# Patient Record
Sex: FEMALE | Race: AMERICAN INDIAN OR ALASKA NATIVE
[De-identification: names, ages, dates, MRNs, and addresses within clinical notes are randomized per-mention and may not be internally consistent; named-entity substitution may affect disease eponyms.]

---

## 2018-10-03 ENCOUNTER — HOSPITAL ENCOUNTER (EMERGENCY)
Dept: HOSPITAL 31 - C.ER | Age: 38
Discharge: HOME | End: 2018-10-03
Payer: COMMERCIAL

## 2018-10-03 VITALS — DIASTOLIC BLOOD PRESSURE: 69 MMHG | SYSTOLIC BLOOD PRESSURE: 108 MMHG | RESPIRATION RATE: 18 BRPM | HEART RATE: 65 BPM

## 2018-10-03 VITALS — TEMPERATURE: 98.5 F

## 2018-10-03 VITALS — OXYGEN SATURATION: 94 %

## 2018-10-03 VITALS — BODY MASS INDEX: 31.4 KG/M2

## 2018-10-03 DIAGNOSIS — R10.31: Primary | ICD-10-CM

## 2018-10-03 LAB
ALBUMIN SERPL-MCNC: 3.6 G/DL (ref 3.5–5)
ALBUMIN/GLOB SERPL: 1.1 {RATIO} (ref 1–2.1)
ALT SERPL-CCNC: 46 U/L (ref 9–52)
AST SERPL-CCNC: 24 U/L (ref 14–36)
BASOPHILS # BLD AUTO: 0.1 K/UL (ref 0–0.2)
BASOPHILS NFR BLD: 1.4 % (ref 0–2)
BILIRUB UR-MCNC: NEGATIVE MG/DL
BUN SERPL-MCNC: 11 MG/DL (ref 7–17)
CALCIUM SERPL-MCNC: 9.3 MG/DL (ref 8.6–10.4)
EOSINOPHIL # BLD AUTO: 0.1 K/UL (ref 0–0.7)
EOSINOPHIL NFR BLD: 1 % (ref 0–4)
ERYTHROCYTE [DISTWIDTH] IN BLOOD BY AUTOMATED COUNT: 14.3 % (ref 11.5–14.5)
GFR NON-AFRICAN AMERICAN: > 60
GLUCOSE UR STRIP-MCNC: NORMAL MG/DL
HGB BLD-MCNC: 11.3 G/DL (ref 11–16)
LEUKOCYTE ESTERASE UR-ACNC: (no result) LEU/UL
LIPASE: 93 U/L (ref 23–300)
LYMPHOCYTES # BLD AUTO: 2.8 K/UL (ref 1–4.3)
LYMPHOCYTES NFR BLD AUTO: 41.2 % (ref 20–40)
MCH RBC QN AUTO: 28.2 PG (ref 27–31)
MCHC RBC AUTO-ENTMCNC: 32.8 G/DL (ref 33–37)
MCV RBC AUTO: 86.1 FL (ref 81–99)
MONOCYTES # BLD: 0.7 K/UL (ref 0–0.8)
MONOCYTES NFR BLD: 9.4 % (ref 0–10)
NEUTROPHILS # BLD: 3.3 K/UL (ref 1.8–7)
NEUTROPHILS NFR BLD AUTO: 47 % (ref 50–75)
NRBC BLD AUTO-RTO: 0.1 % (ref 0–2)
PH UR STRIP: 5 [PH] (ref 5–8)
PLATELET # BLD: 294 K/UL (ref 130–400)
PMV BLD AUTO: 8.3 FL (ref 7.2–11.7)
PROT UR STRIP-MCNC: NEGATIVE MG/DL
RBC # BLD AUTO: 4.01 MIL/UL (ref 3.8–5.2)
RBC # UR STRIP: (no result) /UL
SP GR UR STRIP: 1.02 (ref 1–1.03)
SQUAMOUS EPITHIAL: 9 /HPF (ref 0–5)
UROBILINOGEN UR-MCNC: NORMAL MG/DL (ref 0.2–1)
WBC # BLD AUTO: 6.9 K/UL (ref 4.8–10.8)

## 2018-10-03 PROCEDURE — 83690 ASSAY OF LIPASE: CPT

## 2018-10-03 PROCEDURE — 74176 CT ABD & PELVIS W/O CONTRAST: CPT

## 2018-10-03 PROCEDURE — 96361 HYDRATE IV INFUSION ADD-ON: CPT

## 2018-10-03 PROCEDURE — 96374 THER/PROPH/DIAG INJ IV PUSH: CPT

## 2018-10-03 PROCEDURE — 84703 CHORIONIC GONADOTROPIN ASSAY: CPT

## 2018-10-03 PROCEDURE — 81001 URINALYSIS AUTO W/SCOPE: CPT

## 2018-10-03 PROCEDURE — 85025 COMPLETE CBC W/AUTO DIFF WBC: CPT

## 2018-10-03 PROCEDURE — 80053 COMPREHEN METABOLIC PANEL: CPT

## 2018-10-03 PROCEDURE — 99285 EMERGENCY DEPT VISIT HI MDM: CPT

## 2018-10-03 NOTE — CT
Date of service: 



10/03/2018



PROCEDURE:  CT Abdomen and Pelvis without intravenous contrast



HISTORY:

Pain



COMPARISON:

None.



TECHNIQUE:

Without contrast.. 



Contrast dose: 0



Radiation dose:



Total exam DLP = 791.68 mGy-cm.



This CT exam was performed using one or more of the following dose 

reduction techniques: Automated exposure control, adjustment of the 

mA and/or kV according to patient size, and/or use of iterative 

reconstruction technique.



FINDINGS:



LOWER THORAX:

Unremarkable. 



LIVER:

Unremarkable. No gross lesion or ductal dilatation.  



GALLBLADDER AND BILE DUCTS:

Unremarkable. 



PANCREAS:

Unremarkable. No gross lesion or ductal dilatation.



SPLEEN:

Unremarkable. 



ADRENALS:

Unremarkable. No mass. 



KIDNEYS AND URETERS:

Unremarkable. No hydronephrosis. No solid mass. 



VASCULATURE:

Unremarkable. No aortic aneurysm. 



BOWEL:

Unremarkable. No obstruction. No gross mural thickening. 



APPENDIX:

Not identified.  No secondary findings to suggest acute appendicitis. 



PERITONEUM:

Trace fluid in the cul-de-sac.  No generalized ascites.  No 

pneumoperitoneum. 



LYMPH NODES:

Unremarkable. No enlarged lymph nodes. 



BLADDER:

Unremarkable. 



REPRODUCTIVE:

Unremarkable uterus 



BONES:

No acute fracture. 



OTHER FINDINGS:

None.



IMPRESSION:

No significant abnormality identified.  Trace fluid in the cul-de-sac 

common nonspecific.  Otherwise normal examination.

## 2018-10-03 NOTE — C.PDOC
History Of Present Illness


37 year old female presents to the ED with 1 day history of lower abdominal 

pain. Also reports she does have some diarrhea. Patient denies any fever, 

chills, vomiting, urinary symptoms, or vaginal bleeding. She also notes some 

decreased appetite.





Time Seen by Provider: 10/03/18 10:11


Chief Complaint (Nursing): Abdominal Pain


History Per: Patient


History/Exam Limitations: no limitations


Onset/Duration Of Symptoms: Days (x1)


Current Symptoms Are (Timing): Still Present


Associated Symptoms: Diarrhea, Loss Of Appetite





Past Medical History


Reviewed: Historical Data, Nursing Documentation, Vital Signs


Vital Signs: 





                                Last Vital Signs











Temp  98.5 F   10/03/18 10:21


 


Pulse  72   10/03/18 10:21


 


Resp  20   10/03/18 10:21


 


BP  106/68   10/03/18 10:21


 


Pulse Ox  94 L  10/03/18 10:21











Other Surgeries: ovarian cyst removed in August, 2018


Family History: States: Unknown Family Hx





- Social History


Hx Tobacco Use: No


Hx Alcohol Use: No


Hx Substance Use: No





- Immunization History


Hx Tetanus Toxoid Vaccination: No


Hx Influenza Vaccination: No


Hx Pneumococcal Vaccination: No





Review Of Systems


Except As Marked, All Systems Reviewed And Found Negative.


Constitutional: Negative for: Fever, Chills


Gastrointestinal: Positive for: Abdominal Pain, Diarrhea.  Negative for: 

Vomiting, Hematochezia


Genitourinary: Negative for: Dysuria, Frequency, Incontinence, Vaginal Bleeding





Physical Exam





- Physical Exam


Appears: Non-toxic, No Acute Distress


Skin: Normal Color, Warm, Dry


Head: Atraumatic, Normacephalic


Eye(s): bilateral: Normal Inspection, PERRL, EOMI


Oral Mucosa: Moist


Neck: Normal ROM


Chest: Symmetrical


Cardiovascular: Rhythm Regular, No Murmur


Respiratory: Normal Breath Sounds, No Accessory Muscle Use


Gastrointestinal/Abdominal: Soft, Tenderness (to the right lower quadrant), No 

Guarding, No Rebound


Extremity: Bilateral: Atraumatic, Normal Color And Temperature, Normal ROM


Neurological/Psych: Oriented x3, Normal Speech


Gait: Steady





ED Course And Treatment





- Laboratory Results


Result Diagrams: 


                                 10/03/18 11:17





                                 10/03/18 11:17


Lab Interpretation: Normal


Urine Pregnancy POC: Negative


O2 Sat by Pulse Oximetry: 94 (RA)


Pulse Ox Interpretation: Normal





- CT Scan/US


  ** No standard instances


Other Rad Studies (CT/US): Read By Radiologist, Radiology Report Reviewed


CT/US Interpretation: FINDINGS:  LOWER THORAX:  Unremarkable.  LIVER:  

Unremarkable. No gross lesion or ductal dilatation.  GALLBLADDER AND BILE DUCTS:

 Unremarkable.  PANCREAS:  Unremarkable. No gross lesion or ductal dilatation.  

SPLEEN:  Unremarkable.  ADRENALS:  Unremarkable. No mass.  KIDNEYS AND URETERS: 

Unremarkable. No hydronephrosis. No solid mass.  VASCULATURE:  Unremarkable. No 

aortic aneurysm.  BOWEL:  Unremarkable. No obstruction. No gross mural 

thickening.  APPENDIX:  Not identified.  No secondary findings to suggest acute 

appendicitis.  PERITONEUM:  Trace fluid in the cul-de-sac.  No generalized 

ascites.  No pneumoperitoneum.  LYMPH NODES:  Unremarkable. No enlarged lymph 

nodes.  BLADDER:  Unremarkable.  REPRODUCTIVE:  Unremarkable uterus.  BONES:  No

acute fracture.  OTHER FINDINGS:  None.  IMPRESSION:  No significant abnormality

identified.  Trace fluid in the cul-de-sac common nonspecific.  Otherwise normal

examination.


Progress Note: Treated with IVF NSS and toradol.  On re-evaluation abdomen soft 

non-tender in no distress


Reassessment Condition: Improved





Medical Decision Making


Medical Decision Making: 





Impression: 37 year old with right lower quadrant pain





Plan:


--CMP


--Lipase


--CBC


--IV fluids


--Toradol 30 mg IVP


--Urine HCG


--Urinalysis


--CT Abdomen/Pelvis without contrast


--Reassess and disposition











Disposition


Counseled Patient/Family Regarding: Studies Performed, Diagnosis, Need For 

Followup





- Disposition


Disposition: HOME/ ROUTINE


Disposition Time: 12:40


Condition: STABLE


Additional Instructions: 


Follow up with clinic or PMD for further evaluation


Instructions:  Acute Abdomen (Belly Pain)


Forms:  [facility] (English)





- POA


Present On Arrival: None





- Clinical Impression


Clinical Impression: 


 Abdominal pain








- PA / NP / Resident Statement


MD/DO has reviewed & agrees with the documentation as recorded.





- Scribe Statement


The provider has reviewed the documentation as recorded by the Scribe (Trixie Santillan)





All medical record entries made by the Scribe were at my direction and 

personally dictated by me. I have reviewed the chart and agree that the record 

accurately reflects my personal performance of the history, physical exam, 

medical decision making, and the department course for this patient. I have also

personally directed, reviewed, and agree with the discharge instructions and 

disposition.